# Patient Record
Sex: FEMALE | Race: WHITE | NOT HISPANIC OR LATINO | Employment: PART TIME | URBAN - NONMETROPOLITAN AREA
[De-identification: names, ages, dates, MRNs, and addresses within clinical notes are randomized per-mention and may not be internally consistent; named-entity substitution may affect disease eponyms.]

---

## 2019-11-16 ENCOUNTER — APPOINTMENT (OUTPATIENT)
Dept: GENERAL RADIOLOGY | Facility: OTHER | Age: 39
End: 2019-11-16
Attending: PHYSICIAN ASSISTANT
Payer: COMMERCIAL

## 2019-11-16 ENCOUNTER — HOSPITAL ENCOUNTER (EMERGENCY)
Facility: OTHER | Age: 39
Discharge: HOME | End: 2019-11-16
Attending: PHYSICIAN ASSISTANT | Admitting: SURGERY
Payer: COMMERCIAL

## 2019-11-16 ENCOUNTER — ANESTHESIA (OUTPATIENT)
Dept: SURGERY | Facility: OTHER | Age: 39
End: 2019-11-16
Payer: COMMERCIAL

## 2019-11-16 ENCOUNTER — ANESTHESIA EVENT (OUTPATIENT)
Dept: SURGERY | Facility: OTHER | Age: 39
End: 2019-11-16

## 2019-11-16 ENCOUNTER — ANESTHESIA (OUTPATIENT)
Dept: SURGERY | Facility: OTHER | Age: 39
End: 2019-11-16

## 2019-11-16 ENCOUNTER — ANESTHESIA EVENT (OUTPATIENT)
Dept: SURGERY | Facility: OTHER | Age: 39
End: 2019-11-16
Payer: COMMERCIAL

## 2019-11-16 VITALS
SYSTOLIC BLOOD PRESSURE: 109 MMHG | HEART RATE: 60 BPM | OXYGEN SATURATION: 100 % | RESPIRATION RATE: 20 BRPM | TEMPERATURE: 98.8 F | DIASTOLIC BLOOD PRESSURE: 48 MMHG | WEIGHT: 130 LBS

## 2019-11-16 DIAGNOSIS — S97.122A: ICD-10-CM

## 2019-11-16 DIAGNOSIS — S97.102A CRUSH INJURY OF TOE, LEFT, INITIAL ENCOUNTER: Primary | ICD-10-CM

## 2019-11-16 DIAGNOSIS — S92.912B: ICD-10-CM

## 2019-11-16 PROBLEM — N92.6 IRREGULAR PERIODS: Status: ACTIVE | Noted: 2019-10-24

## 2019-11-16 PROBLEM — R87.613 HIGH GRADE SQUAMOUS INTRAEPITHELIAL LESION (HGSIL) ON CYTOLOGIC SMEAR OF CERVIX: Status: ACTIVE | Noted: 2019-10-24

## 2019-11-16 PROBLEM — B97.7 HPV IN FEMALE: Status: ACTIVE | Noted: 2017-09-26

## 2019-11-16 PROBLEM — F41.9 ANXIETY: Status: ACTIVE | Noted: 2019-10-24

## 2019-11-16 LAB — HCG UR QL: NEGATIVE

## 2019-11-16 PROCEDURE — 96374 THER/PROPH/DIAG INJ IV PUSH: CPT | Mod: XU | Performed by: PHYSICIAN ASSISTANT

## 2019-11-16 PROCEDURE — 28825 PARTIAL AMPUTATION OF TOE: CPT | Performed by: NURSE ANESTHETIST, CERTIFIED REGISTERED

## 2019-11-16 PROCEDURE — 99284 EMERGENCY DEPT VISIT MOD MDM: CPT | Mod: 25 | Performed by: PHYSICIAN ASSISTANT

## 2019-11-16 PROCEDURE — 73660 X-RAY EXAM OF TOE(S): CPT | Mod: LT

## 2019-11-16 PROCEDURE — 88300 SURGICAL PATH GROSS: CPT

## 2019-11-16 PROCEDURE — 37000008 ZZH ANESTHESIA TECHNICAL FEE, 1ST 30 MIN: Performed by: SURGERY

## 2019-11-16 PROCEDURE — 99203 OFFICE O/P NEW LOW 30 MIN: CPT | Mod: 25 | Performed by: SURGERY

## 2019-11-16 PROCEDURE — 25800030 ZZH RX IP 258 OP 636: Performed by: NURSE ANESTHETIST, CERTIFIED REGISTERED

## 2019-11-16 PROCEDURE — 25800030 ZZH RX IP 258 OP 636: Performed by: PHYSICIAN ASSISTANT

## 2019-11-16 PROCEDURE — 25000128 H RX IP 250 OP 636: Performed by: NURSE ANESTHETIST, CERTIFIED REGISTERED

## 2019-11-16 PROCEDURE — 71000012 ZZH RECOVERY PHASE 1 LEVEL 1 FIRST HR: Performed by: SURGERY

## 2019-11-16 PROCEDURE — 27210794 ZZH OR GENERAL SUPPLY STERILE: Performed by: SURGERY

## 2019-11-16 PROCEDURE — 90471 IMMUNIZATION ADMIN: CPT | Performed by: PHYSICIAN ASSISTANT

## 2019-11-16 PROCEDURE — 99284 EMERGENCY DEPT VISIT MOD MDM: CPT | Mod: Z6 | Performed by: PHYSICIAN ASSISTANT

## 2019-11-16 PROCEDURE — 25000125 ZZHC RX 250: Performed by: PHYSICIAN ASSISTANT

## 2019-11-16 PROCEDURE — 25000128 H RX IP 250 OP 636: Performed by: SURGERY

## 2019-11-16 PROCEDURE — 99140 ANES COMP EMERGENCY COND: CPT | Performed by: NURSE ANESTHETIST, CERTIFIED REGISTERED

## 2019-11-16 PROCEDURE — 90715 TDAP VACCINE 7 YRS/> IM: CPT | Performed by: PHYSICIAN ASSISTANT

## 2019-11-16 PROCEDURE — 37000009 ZZH ANESTHESIA TECHNICAL FEE, EACH ADDTL 15 MIN: Performed by: SURGERY

## 2019-11-16 PROCEDURE — 28825 PARTIAL AMPUTATION OF TOE: CPT | Performed by: SURGERY

## 2019-11-16 PROCEDURE — 36000052 ZZH SURGERY LEVEL 2 EA 15 ADDTL MIN: Performed by: SURGERY

## 2019-11-16 PROCEDURE — 81025 URINE PREGNANCY TEST: CPT | Performed by: NURSE ANESTHETIST, CERTIFIED REGISTERED

## 2019-11-16 PROCEDURE — 25000128 H RX IP 250 OP 636: Performed by: PHYSICIAN ASSISTANT

## 2019-11-16 PROCEDURE — 25800025 ZZH RX 258: Performed by: SURGERY

## 2019-11-16 PROCEDURE — 36000050 ZZH SURGERY LEVEL 2 1ST 30 MIN: Performed by: SURGERY

## 2019-11-16 RX ORDER — BUPIVACAINE HYDROCHLORIDE 5 MG/ML
INJECTION, SOLUTION PERINEURAL PRN
Status: DISCONTINUED | OUTPATIENT
Start: 2019-11-16 | End: 2019-11-16 | Stop reason: HOSPADM

## 2019-11-16 RX ORDER — OXYCODONE HYDROCHLORIDE 5 MG/1
5 TABLET ORAL
Status: DISCONTINUED | OUTPATIENT
Start: 2019-11-16 | End: 2019-11-16 | Stop reason: HOSPADM

## 2019-11-16 RX ORDER — LIDOCAINE HYDROCHLORIDE 10 MG/ML
10 INJECTION, SOLUTION INFILTRATION; PERINEURAL ONCE
Status: COMPLETED | OUTPATIENT
Start: 2019-11-16 | End: 2019-11-16

## 2019-11-16 RX ORDER — IBUPROFEN 600 MG/1
600 TABLET, FILM COATED ORAL 4 TIMES DAILY
Qty: 12 TABLET | Refills: 0 | Status: SHIPPED | OUTPATIENT
Start: 2019-11-16 | End: 2019-11-21

## 2019-11-16 RX ORDER — CEFAZOLIN SODIUM 1 G/3ML
1 INJECTION, POWDER, FOR SOLUTION INTRAMUSCULAR; INTRAVENOUS SEE ADMIN INSTRUCTIONS
Status: CANCELLED | OUTPATIENT
Start: 2019-11-16

## 2019-11-16 RX ORDER — KETOROLAC TROMETHAMINE 30 MG/ML
INJECTION, SOLUTION INTRAMUSCULAR; INTRAVENOUS PRN
Status: DISCONTINUED | OUTPATIENT
Start: 2019-11-16 | End: 2019-11-16

## 2019-11-16 RX ORDER — CEFAZOLIN SODIUM 2 G/100ML
INJECTION, SOLUTION INTRAVENOUS PRN
Status: DISCONTINUED | OUTPATIENT
Start: 2019-11-16 | End: 2019-11-16

## 2019-11-16 RX ORDER — ONDANSETRON 4 MG/1
4 TABLET, ORALLY DISINTEGRATING ORAL EVERY 30 MIN PRN
Status: DISCONTINUED | OUTPATIENT
Start: 2019-11-16 | End: 2019-11-16 | Stop reason: HOSPADM

## 2019-11-16 RX ORDER — ONDANSETRON 2 MG/ML
4 INJECTION INTRAMUSCULAR; INTRAVENOUS EVERY 30 MIN PRN
Status: DISCONTINUED | OUTPATIENT
Start: 2019-11-16 | End: 2019-11-16 | Stop reason: HOSPADM

## 2019-11-16 RX ORDER — FENTANYL CITRATE 50 UG/ML
25-50 INJECTION, SOLUTION INTRAMUSCULAR; INTRAVENOUS
Status: CANCELLED | OUTPATIENT
Start: 2019-11-16

## 2019-11-16 RX ORDER — MEPERIDINE HYDROCHLORIDE 50 MG/ML
12.5 INJECTION INTRAMUSCULAR; INTRAVENOUS; SUBCUTANEOUS
Status: DISCONTINUED | OUTPATIENT
Start: 2019-11-16 | End: 2019-11-16 | Stop reason: HOSPADM

## 2019-11-16 RX ORDER — SODIUM CHLORIDE, SODIUM LACTATE, POTASSIUM CHLORIDE, CALCIUM CHLORIDE 600; 310; 30; 20 MG/100ML; MG/100ML; MG/100ML; MG/100ML
INJECTION, SOLUTION INTRAVENOUS CONTINUOUS PRN
Status: DISCONTINUED | OUTPATIENT
Start: 2019-11-16 | End: 2019-11-16

## 2019-11-16 RX ORDER — CEFAZOLIN SODIUM 2 G/100ML
2 INJECTION, SOLUTION INTRAVENOUS
Status: CANCELLED | OUTPATIENT
Start: 2019-11-16

## 2019-11-16 RX ORDER — NALOXONE HYDROCHLORIDE 0.4 MG/ML
.1-.4 INJECTION, SOLUTION INTRAMUSCULAR; INTRAVENOUS; SUBCUTANEOUS
Status: DISCONTINUED | OUTPATIENT
Start: 2019-11-16 | End: 2019-11-16 | Stop reason: HOSPADM

## 2019-11-16 RX ORDER — FENTANYL CITRATE 50 UG/ML
50 INJECTION, SOLUTION INTRAMUSCULAR; INTRAVENOUS EVERY 30 MIN PRN
Status: DISCONTINUED | OUTPATIENT
Start: 2019-11-16 | End: 2019-11-16 | Stop reason: HOSPADM

## 2019-11-16 RX ORDER — ACETAMINOPHEN 10 MG/ML
INJECTION, SOLUTION INTRAVENOUS PRN
Status: DISCONTINUED | OUTPATIENT
Start: 2019-11-16 | End: 2019-11-16

## 2019-11-16 RX ORDER — FENTANYL CITRATE 50 UG/ML
25-50 INJECTION, SOLUTION INTRAMUSCULAR; INTRAVENOUS
Status: DISCONTINUED | OUTPATIENT
Start: 2019-11-16 | End: 2019-11-16 | Stop reason: HOSPADM

## 2019-11-16 RX ORDER — SODIUM CHLORIDE, SODIUM LACTATE, POTASSIUM CHLORIDE, CALCIUM CHLORIDE 600; 310; 30; 20 MG/100ML; MG/100ML; MG/100ML; MG/100ML
INJECTION, SOLUTION INTRAVENOUS CONTINUOUS
Status: CANCELLED | OUTPATIENT
Start: 2019-11-16

## 2019-11-16 RX ORDER — SODIUM CHLORIDE 9 MG/ML
INJECTION, SOLUTION INTRAVENOUS CONTINUOUS
Status: DISCONTINUED | OUTPATIENT
Start: 2019-11-16 | End: 2019-11-16 | Stop reason: HOSPADM

## 2019-11-16 RX ORDER — ACETAMINOPHEN 325 MG/1
650 TABLET ORAL 4 TIMES DAILY
Qty: 24 TABLET | Refills: 0 | Status: SHIPPED | OUTPATIENT
Start: 2019-11-16 | End: 2019-11-21

## 2019-11-16 RX ORDER — SODIUM CHLORIDE, SODIUM LACTATE, POTASSIUM CHLORIDE, CALCIUM CHLORIDE 600; 310; 30; 20 MG/100ML; MG/100ML; MG/100ML; MG/100ML
INJECTION, SOLUTION INTRAVENOUS CONTINUOUS
Status: DISCONTINUED | OUTPATIENT
Start: 2019-11-16 | End: 2019-11-16 | Stop reason: HOSPADM

## 2019-11-16 RX ORDER — KETOROLAC TROMETHAMINE 15 MG/ML
15 INJECTION, SOLUTION INTRAMUSCULAR; INTRAVENOUS ONCE
Status: COMPLETED | OUTPATIENT
Start: 2019-11-16 | End: 2019-11-16

## 2019-11-16 RX ORDER — LIDOCAINE 40 MG/G
CREAM TOPICAL
Status: CANCELLED | OUTPATIENT
Start: 2019-11-16

## 2019-11-16 RX ORDER — ACETAMINOPHEN 650 MG
TABLET, EXTENDED RELEASE ORAL ONCE
Status: COMPLETED | OUTPATIENT
Start: 2019-11-16 | End: 2019-11-16

## 2019-11-16 RX ORDER — HYDROCODONE BITARTRATE AND ACETAMINOPHEN 5; 325 MG/1; MG/1
1 TABLET ORAL EVERY 6 HOURS PRN
Qty: 12 TABLET | Refills: 0 | Status: SHIPPED | OUTPATIENT
Start: 2019-11-16 | End: 2019-11-21

## 2019-11-16 RX ADMIN — SODIUM CHLORIDE, POTASSIUM CHLORIDE, SODIUM LACTATE AND CALCIUM CHLORIDE: 600; 310; 30; 20 INJECTION, SOLUTION INTRAVENOUS at 14:35

## 2019-11-16 RX ADMIN — KETOROLAC TROMETHAMINE 15 MG: 15 INJECTION, SOLUTION INTRAMUSCULAR; INTRAVENOUS at 13:10

## 2019-11-16 RX ADMIN — POVIDONE-IODINE: 10 SOLUTION TOPICAL at 12:53

## 2019-11-16 RX ADMIN — CEFAZOLIN SODIUM 2 G: 2 INJECTION, SOLUTION INTRAVENOUS at 14:39

## 2019-11-16 RX ADMIN — SODIUM CHLORIDE: 9 INJECTION, SOLUTION INTRAVENOUS at 12:52

## 2019-11-16 RX ADMIN — KETOROLAC TROMETHAMINE 15 MG: 30 INJECTION, SOLUTION INTRAMUSCULAR at 14:48

## 2019-11-16 RX ADMIN — TETANUS TOXOID, REDUCED DIPHTHERIA TOXOID AND ACELLULAR PERTUSSIS VACCINE, ADSORBED 0.5 ML: 5; 2.5; 8; 8; 2.5 SUSPENSION INTRAMUSCULAR at 12:54

## 2019-11-16 RX ADMIN — MIDAZOLAM 2 MG: 1 INJECTION INTRAMUSCULAR; INTRAVENOUS at 14:37

## 2019-11-16 RX ADMIN — ACETAMINOPHEN 1000 MG: 10 INJECTION, SOLUTION INTRAVENOUS at 14:48

## 2019-11-16 RX ADMIN — LIDOCAINE HYDROCHLORIDE 10 ML: 10 INJECTION, SOLUTION EPIDURAL; INFILTRATION; INTRACAUDAL; PERINEURAL at 12:01

## 2019-11-16 RX ADMIN — MIDAZOLAM 2 MG: 1 INJECTION INTRAMUSCULAR; INTRAVENOUS at 14:40

## 2019-11-16 ASSESSMENT — ENCOUNTER SYMPTOMS
BACK PAIN: 0
CONFUSION: 0
HEMATURIA: 0
CHILLS: 0
SHORTNESS OF BREATH: 0
CHEST TIGHTNESS: 0
FEVER: 0
ADENOPATHY: 0
ABDOMINAL PAIN: 0
BRUISES/BLEEDS EASILY: 0
WOUND: 0

## 2019-11-16 NOTE — PROGRESS NOTES
Admission Note    Data:  Lakisha Mccoy admitted to Med/Surg/Peds room 332 from surgery at 1550.      Action:  Dr. Avalos has been notified of admission. Pt oriented to unit, call light in reach.     Response:  Patient Tolerated .

## 2019-11-16 NOTE — BRIEF OP NOTE
[facility]    Brief Operative Note    Pre-operative diagnosis: Injury of toe on left foot, initial encounter [S99.922A]  Post-operative diagnosis Crush injury left third toe with open fracture distal phalanx    Procedure: Procedure(s):  PARTIAL AMPUTATION, TOE 3RD DIGIT  Surgeon: Surgeon(s) and Role:     * Kj Avalos MD - Primary  Anesthesia: Monitor Anesthesia Care   Estimated blood loss: Minimal  Drains: None  Specimens:   ID Type Source Tests Collected by Time Destination   A : partial amputation left 3rd toe  Tissue Toe SURGICAL PATHOLOGY EXAM Kj Avalos MD 11/16/2019  2:59 PM      Findings:   None.  Complications: None.  Implants: * No implants in log *

## 2019-11-16 NOTE — ED PROVIDER NOTES
History     Chief Complaint   Patient presents with     Toe Pain     This is a 39-year-old female who was working with her  unloading wood he had Coster a piece of wood and she accidentally dropped it landed on her left foot directly onto her middle toe.  She sustained a crush injury to this toe.  She is here for further evaluation at this time her last tetanus was 2014.  She denies any other injuries.            Allergies:  Allergies no known allergies    Problem List:    There are no active problems to display for this patient.       Past Medical History:    Past Medical History:   Diagnosis Date     History of other genital system and obstetric disorders        Past Surgical History:    No past surgical history on file.    Family History:    Family History   Problem Relation Age of Onset     Heart Disease Maternal Grandmother         Heart Disease,MI     Heart Disease Maternal Grandfather         Heart Disease,MI     Colon Cancer Paternal Grandmother 58        Cancer-colon     Other - See Comments Paternal Uncle 40        Colon polyps     Other - See Comments No family hx of         History Negative for diabetes, anxiety, depression, or chemical depend*       Social History:  Marital Status:   [2]  Social History     Tobacco Use     Smoking status: Not on file   Substance Use Topics     Alcohol use: Not on file     Drug use: Not on file        Medications:    No current outpatient medications on file.        Review of Systems   Constitutional: Negative for chills and fever.   HENT: Negative for congestion.    Eyes: Negative for visual disturbance.   Respiratory: Negative for chest tightness and shortness of breath.    Cardiovascular: Negative for chest pain.   Gastrointestinal: Negative for abdominal pain.   Genitourinary: Negative for hematuria.   Musculoskeletal: Negative for back pain.        Crush injury to left middle toe with pain   Skin: Negative for rash and wound.   Neurological: Negative  for syncope.   Hematological: Negative for adenopathy. Does not bruise/bleed easily.   Psychiatric/Behavioral: Negative for confusion.       Physical Exam   BP: (!) 138/91  Pulse: 83  Temp: 97.9  F (36.6  C)  Resp: 20  SpO2: 98 %      Physical Exam  Constitutional:       General: She is not in acute distress.     Appearance: She is well-developed. She is not diaphoretic.   HENT:      Head: Normocephalic and atraumatic.   Eyes:      General: No scleral icterus.     Conjunctiva/sclera: Conjunctivae normal.   Neck:      Musculoskeletal: Neck supple.   Cardiovascular:      Rate and Rhythm: Normal rate and regular rhythm.   Pulmonary:      Effort: Pulmonary effort is normal.      Breath sounds: Normal breath sounds.   Abdominal:      Palpations: Abdomen is soft.      Tenderness: There is no abdominal tenderness.   Musculoskeletal:         General: Tenderness, deformity and signs of injury present.      Comments: Left middle toe with crush injury and blowout of the skin area.  Nail damage as well.  Minimal bleeding at this time.   Lymphadenopathy:      Cervical: No cervical adenopathy.   Skin:     General: Skin is warm and dry.      Capillary Refill: Capillary refill takes less than 2 seconds.      Findings: No rash.   Neurological:      General: No focal deficit present.      Mental Status: She is alert.   Psychiatric:         Mood and Affect: Mood normal.         Behavior: Behavior normal.         ED Course     After patient returned from x-ray and she was given a digital block to her left middle toe with 8 cc of 1% lidocaine.  After thorough evaluation of the distal tip of her toe much of the skin is crush to the point where repair would be difficult.  I discussed this case with Dr. Avalos with my concerns that it may be better off to amputate the distal tip of her left middle toe and he will come down and evaluate it at this time.      Results for orders placed or performed during the hospital encounter of 11/16/19  (from the past 24 hour(s))   XR Toe Left G/E 2 Views    Narrative    PROCEDURE:  XR TOE LT G/E 2 VW    HISTORY: crush injury    COMPARISON:  None.    TECHNIQUE:  3 views of the right third toe were obtained.    FINDINGS:  There is a severely comminuted fracture of the terminal  tuft of the right third toe. The remainder of the toes intact. The  articular spaces are normal in height.       Impression    IMPRESSION: Severely comminuted terminal tuft fracture third toe      TORO LUGO MD       Medications   povidone-iodine (BETADINE) 10 % topical solution (has no administration in time range)   Tdap (tetanus-diptheria-acell pertussis) (BOOSTRIX) injection 0.5 mL (has no administration in time range)       Assessments & Plan (with Medical Decision Making)     I have reviewed the nursing notes.    I have reviewed the findings, diagnosis, plan and need for follow up with the patient.      New Prescriptions    No medications on file       Final diagnoses:   Crushing injury of third toe of left foot, initial encounter   Open fracture of left toe     Afebrile.  Vital signs stable.  Crush injury to her left third or middle toe.  Blowout laceration noted.  X-rays show severely comminuted terminal tuft fracture to the third toe.  After giving the patient a digital block and further examination of her crush injury was felt that she may benefit from partial amputation and surgical repair.  I discussed this case with  who plans on bringing her back to the OR for surgical intervention.  Otherwise she is medically cleared for the surgery.    11/16/2019   [facility]     Robert Abreu PA-C  11/16/19 9011

## 2019-11-16 NOTE — ANESTHESIA CARE TRANSFER NOTE
Patient: Lakisha KOROMA Block    Procedure(s):  PARTIAL AMPUTATION, TOE 3RD DIGIT    Diagnosis: Injury of toe on left foot, initial encounter [S99.922A]  Diagnosis Additional Information: No value filed.    Anesthesia Type:   No value filed.     Note:  Airway :Room Air  Patient transferred to:Phase II  Handoff Report: Identifed the Patient, Identified the Reponsible Provider, Reviewed the pertinent medical history, Discussed the surgical course, Reviewed Intra-OP anesthesia mangement and issues during anesthesia, Set expectations for post-procedure period and Allowed opportunity for questions and acknowledgement of understanding      Vitals: (Last set prior to Anesthesia Care Transfer)    CRNA VITALS  11/16/2019 1441 - 11/16/2019 1521      11/16/2019             NIBP:  112/63    Pulse:  61    NIBP Mean:  81    Ht Rate:  61    SpO2:  100 %    Resp Rate (observed):  (!) 1    Resp Rate (set):  10                Electronically Signed By: LILIBETH Baird CRNA  November 16, 2019  3:21 PM

## 2019-11-16 NOTE — ANESTHESIA PREPROCEDURE EVALUATION
Anesthesia Pre-Procedure Evaluation    Patient: Lakisha Mccoy   MRN: [id number] : 1980          Preoperative Diagnosis: Injury of toe on left foot, initial encounter [S99.922A]    Procedure(s):  PARTIAL AMPUTATION, TOE 3RD DIGIT    Past Medical History:   Diagnosis Date     History of other genital system and obstetric disorders      A1, miscarriage-Normal vaginal delivery at term     Past Surgical History:   Procedure Laterality Date     HX AMPUTATION Left 2019    Partial amputation left third ( middle ) toe       Anesthesia Evaluation     . Pt has not had prior anesthetic            ROS/MED HX    ENT/Pulmonary:  - neg pulmonary ROS     Neurologic:  - neg neurologic ROS     Cardiovascular:  - neg cardiovascular ROS       METS/Exercise Tolerance:     Hematologic:  - neg hematologic  ROS       Musculoskeletal: Comment: Left third toe fx  (+) fracture lower extremity: Other (Comment), -       GI/Hepatic:  - neg GI/hepatic ROS       Renal/Genitourinary:  - ROS Renal section negative       Endo:  - neg endo ROS       Psychiatric:     (+) psychiatric history anxiety      Infectious Disease:  - neg infectious disease ROS       Malignancy:      - no malignancy   Other:    - neg other ROS                      Physical Exam  Normal systems: cardiovascular, pulmonary and dental    Airway   Mallampati: II  TM distance: >3 FB  Neck ROM: full    Dental     Cardiovascular   Rhythm and rate: regular and normal      Pulmonary    breath sounds clear to auscultation            Lab Results   Component Value Date    HCG Negative 2019       Preop Vitals  BP Readings from Last 3 Encounters:   19 115/62    Pulse Readings from Last 3 Encounters:   19 63      Resp Readings from Last 3 Encounters:   19 22    SpO2 Readings from Last 3 Encounters:   19 97%      Temp Readings from Last 1 Encounters:   19 98.1  F (36.7  C)    Ht Readings from Last 1 Encounters:   No data found for Ht      Wt  Readings from Last 1 Encounters:   11/16/19 59 kg (130 lb)    There is no height or weight on file to calculate BMI.       Anesthesia Plan      History & Physical Review      ASA Status:  1 emergent.        Plan for MAC (anxiolysis, local per surgeon )          Postoperative Care      Consents  Anesthetic plan, risks, benefits and alternatives discussed with:  Patient..                 LILIBETH Baird CRNA

## 2019-11-16 NOTE — ANESTHESIA PREPROCEDURE EVALUATION
Anesthesia Pre-Procedure Evaluation    Patient: Lakisha Mccoy   MRN: [id number] : 1980          Preoperative Diagnosis: Crushing injury of third toe of left foot, initial encounter [S97.122A]    Procedure(s):  AMPUTATION, TOE Left third    Past Medical History:   Diagnosis Date     History of other genital system and obstetric disorders      A1, miscarriage-Normal vaginal delivery at term     History reviewed. No pertinent surgical history.    Anesthesia Evaluation     .             ROS/MED HX    ENT/Pulmonary:     (+)allergic rhinitis, , . .    Neurologic:  - neg neurologic ROS     Cardiovascular:  - neg cardiovascular ROS       METS/Exercise Tolerance:     Hematologic:  - neg hematologic  ROS       Musculoskeletal: Comment: Third toe  (+) fracture lower extremity: Other (Comment), -       GI/Hepatic:  - neg GI/hepatic ROS       Renal/Genitourinary:  - ROS Renal section negative       Endo:  - neg endo ROS       Psychiatric:     (+) psychiatric history anxiety      Infectious Disease:  - neg infectious disease ROS       Malignancy:      - no malignancy   Other:    - neg other ROS                      Physical Exam  Normal systems: cardiovascular, pulmonary and dental    Airway   Mallampati: II  TM distance: >3 FB  Neck ROM: full    Dental     Cardiovascular   Rhythm and rate: regular and normal      Pulmonary    breath sounds clear to auscultation            No results found for: WBC, HGB, HCT, PLT, CRP, SED, NA, POTASSIUM, CHLORIDE, CO2, BUN, CR, GLC, VERONICA, PHOS, MAG, ALBUMIN, PROTTOTAL, ALT, AST, GGT, ALKPHOS, BILITOTAL, BILIDIRECT, LIPASE, AMYLASE, JOHNSON, PTT, INR, FIBR, TSH, T4, T3, HCG, HCGS, CKTOTAL, CKMB, TROPN    Preop Vitals  BP Readings from Last 3 Encounters:   19 (!) 138/91    Pulse Readings from Last 3 Encounters:   19 83      Resp Readings from Last 3 Encounters:   19 20    SpO2 Readings from Last 3 Encounters:   19 98%      Temp Readings from Last 1 Encounters:    11/16/19 97.9  F (36.6  C) (Tympanic)    Ht Readings from Last 1 Encounters:   No data found for Ht      Wt Readings from Last 1 Encounters:   11/16/19 59 kg (130 lb)    There is no height or weight on file to calculate BMI.       Anesthesia Plan      History & Physical Review      ASA Status:  1 emergent.        Plan for MAC (Anxiolysis, local per surgeon )          Postoperative Care      Consents  Anesthetic plan, risks, benefits and alternatives discussed with:  Patient..                 LILIBETH Baird CRNA

## 2019-11-16 NOTE — OR NURSING
PACU Transfer Note    Lakisha Mccoy was transferred to [facility] via .  Equipment used for transport:  none.  Accompanied by:  RN  Prescriptions were: sent with pt.    PACU Respiratory Event Documentation     1) Episodes of Apnea greater than or equal to 10 seconds: no    2) Bradypnea - less than 8 breaths per minute: no    3) Pain score on 0 to 10 scale: none    4) Pain-sedation mismatch (yes or no): no    5) Repeated 02 desaturation less than 90% (yes or no): no    Anesthesia notified? (yes or no): no  Report to Thuy TORRES  Any of the above events occuring repeatedly in separate 30 minute intervals may be considered recurrent PACU respiratory events.    Patient stable and meets phase 1 discharge criteria for transport from PACU.

## 2019-11-16 NOTE — ANESTHESIA POSTPROCEDURE EVALUATION
Patient: Lakisha KOROMA Block    Procedure(s):  PARTIAL AMPUTATION, TOE 3RD DIGIT    Diagnosis:Injury of toe on left foot, initial encounter [S99.922A]  Diagnosis Additional Information: No value filed.    Anesthesia Type:  No value filed.    Note:  Anesthesia Post Evaluation    Patient location during evaluation: PACU  Patient participation: Able to fully participate in evaluation  Level of consciousness: awake and alert  Pain management: adequate  Airway patency: patent  Cardiovascular status: acceptable  Respiratory status: acceptable  Hydration status: acceptable  PONV: none     Anesthetic complications: None          Last vitals:  Vitals:    11/16/19 1525 11/16/19 1530 11/16/19 1535   BP: 107/61 111/64 115/62   Pulse: 61 58 63   Resp: 12 12 22   Temp:  98.1  F (36.7  C)    SpO2: 97%           Electronically Signed By: LILIBETH Baird CRNA  November 16, 2019  3:45 PM

## 2019-11-16 NOTE — H&P
Surgical ER Consult  Referring physician:  JESSE Abreu  Primary physician:     Pratibha Patrick    Chief complaint:   Crush injury left third toe    History of present illness:  This is a 39 year old female I am seeing in consultation for a crush injury to left third toe. Dropped a piece of split wood onto left third toe crushing it from mid toe distally. X-ray shows distal phalanx to be comminuted. Up-to-date on tetanus.    Past medical history:   Past Medical History:   Diagnosis Date     History of other genital system and obstetric disorders      A1, miscarriage-Normal vaginal delivery at term       Pastsurgical history:  History reviewed. No pertinent surgical history.    Current medications:  No current outpatient medications on file.       Allergies:  No Known Allergies    Family history:  Family History   Problem Relation Age of Onset     Heart Disease Maternal Grandfather         Heart Disease,MI     Colon Cancer Paternal Grandmother 58        Cancer-colon     Heart Disease Maternal Grandmother         Heart Disease,MI     Other - See Comments Paternal Uncle 40        Colon polyps       Social history:  Social History     Socioeconomic History     Marital status:      Spouse name: Not on file     Number of children: Not on file     Years of education: Not on file     Highest education level: Not on file   Occupational History     Not on file   Social Needs     Financial resource strain: Not on file     Food insecurity:     Worry: Not on file     Inability: Not on file     Transportation needs:     Medical: Not on file     Non-medical: Not on file   Tobacco Use     Smoking status: Not on file   Substance and Sexual Activity     Alcohol use: Not on file     Drug use: Not on file     Sexual activity: Not on file   Lifestyle     Physical activity:     Days per week: Not on file     Minutes per session: Not on file     Stress: Not on file   Relationships     Social connections:     Talks on phone:  Not on file     Gets together: Not on file     Attends Christianity service: Not on file     Active member of club or organization: Not on file     Attends meetings of clubs or organizations: Not on file     Relationship status: Not on file     Intimate partner violence:     Fear of current or ex partner: Not on file     Emotionally abused: Not on file     Physically abused: Not on file     Forced sexual activity: Not on file   Other Topics Concern     Not on file   Social History Narrative    .  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     Has one son-Juan Daniel    Preload  03/11/2013       PROBLEM LIST:  Patient Active Problem List   Diagnosis     Irregular periods     HPV in female     High grade squamous intraepithelial lesion (HGSIL) on cytologic smear of cervix     Anxiety     Allergic rhinitis due to pollen     Crush injury of toe, left, initial encounter     Review of systems:  COMPLETE REVIEW OF SYSTEMS: Denies problems  Gastrointestinal: Denies problems  Cardiovascular: Denies problems  Respiratory:Denies problems  Genitourinary: Denies problems   Musculoskeletal: See HPI  Skin:Denies problems  Neurologic: Denies problems  Psychiatric: Denies problems  Endocrine: Denies problems  Heme/Lymphatic: Denies problems  Vascular: Denies problems    Physical exam: BP (!) 138/91   Pulse 83   Temp 97.9  F (36.6  C) (Tympanic)   Resp 20   Wt 59 kg (130 lb)   SpO2 98%       General: this is a pleasant female patient in no acute distress.  Patient is awake alert and oriented x3 .   Respiratory:  Clear   Cardiovascular: Regular rate and rhythym  Left foot: DP pulse present. Crushed left third toe. Soft tissue is free from bone and extends to mid toe. Remaining toes unremarkable.    Assessment:   Crush injury left third toe with open fracture of distal phalanx    Plan:    Partial left third toe amputation under local MAC as a Day surgery. Risks of bleeding, infection, scarring discussed and  wishes to proceed.      Kj Avalos MD FACS

## 2019-11-16 NOTE — OP NOTE
Procedure Date: 2019      PREOPERATIVE DIAGNOSIS:  Crush injury, left third toe with open fracture of the distal phalanx.      POSTOPERATIVE DIAGNOSIS:  Crush injury, left third toe with open fracture of the distal phalanx.      PROCEDURE:  Partial left third toe amputation.      SURGEON:  Dev Mustafa MD.      ASSISTANT:  NAT Guido.      ANESTHESIA:  Local MAC, LILIBETH Matias CRNA.      INDICATION FOR PROCEDURE:  The patient is a 39-year-old woman who dropped a split piece of wood on her left third toe crushing the distal phalanx.      DESCRIPTION OF PROCEDURE:  After adequate sedation, a local anesthetic consisting of 0.25% Marcaine was infiltrated into the area around the left third toe as a digital block.  The patient was then prepped and draped in the usual sterile fashion.  There was an anterior and a posterior flap created by the crush injury.  The edges of this were smoothed.  There were some fragments of bone that were removed.  Dissection was carried onto the middle phalanx which was then divided and smoothed with a rasp.  The wound was irrigated with normal saline.  There was good hemostasis.  The subcutaneous tissue was closed over the bone with a 2-0 Vicryl suture.  The skin was trimmed to healthy-appearing skin, both on the plantar and dorsal surface.  Interrupted 2-0 nylon sutures were then placed with good approximation.  The dorsal skin was slightly abraded from the injury.  A bulky foot dressing was applied and the patient taken to recovery room in stable condition.         DEV MUSTAFA MD             D: 2019   T: 2019   MT:       Name:     DAISY THOMAS   MRN:      [id number]        Account:        [id number]   :      1980           Procedure Date: 2019      Document: [id number]

## 2019-11-17 NOTE — PLAN OF CARE
Pt denies pain, up walking, and has voided. VS WDL.  Pt denies numbness or tingling to LLE, CMS intact. Unable to assess pedal pulse due to dressing. Dressing had a small amount of shadowing. /48   Pulse 60   Temp 98.8  F (37.1  C) (Tympanic)   Resp 20   Wt 59 kg (130 lb)   SpO2 100%

## 2019-11-21 ENCOUNTER — OFFICE VISIT (OUTPATIENT)
Dept: SURGERY | Facility: OTHER | Age: 39
End: 2019-11-21
Attending: SURGERY
Payer: COMMERCIAL

## 2019-11-21 VITALS
SYSTOLIC BLOOD PRESSURE: 108 MMHG | HEART RATE: 66 BPM | DIASTOLIC BLOOD PRESSURE: 62 MMHG | WEIGHT: 133 LBS | TEMPERATURE: 96.7 F | RESPIRATION RATE: 16 BRPM

## 2019-11-21 DIAGNOSIS — Z98.890 POSTOPERATIVE STATE: Primary | ICD-10-CM

## 2019-11-21 PROBLEM — S97.102A: Status: RESOLVED | Noted: 2019-11-16 | Resolved: 2019-11-21

## 2019-11-21 PROBLEM — S92.912B: Status: RESOLVED | Noted: 2019-11-16 | Resolved: 2019-11-21

## 2019-11-21 PROCEDURE — 99024 POSTOP FOLLOW-UP VISIT: CPT | Performed by: SURGERY

## 2019-11-21 ASSESSMENT — PAIN SCALES - GENERAL: PAINLEVEL: NO PAIN (0)

## 2019-11-21 NOTE — PROGRESS NOTES
Subjective:  This is a follow up after left third toe amputation on 11/16/19. The patient has no complaints.  Foot is sore when walking.     Objective:/62 (BP Location: Right arm, Patient Position: Sitting, Cuff Size: Adult Regular)   Pulse 66   Temp 96.7  F (35.9  C) (Tympanic)   Resp 16   Wt 60.3 kg (133 lb)   Breastfeeding No   The incision is healing well without erythema. Abraded skin is healing. Skin edges appear viable.    Assessment:   Doing well after traumatic amputation of left third toe    Plan:  Leave sutures in for 10-14 days.  Continue neosporin BID  Follow-up in couple of weeks to re-check wound  Recommend reduced work schedule for next couple of weeks.

## 2019-11-25 ENCOUNTER — TELEPHONE (OUTPATIENT)
Dept: SURGERY | Facility: OTHER | Age: 39
End: 2019-11-25

## 2019-11-25 NOTE — LETTER
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

## 2019-12-05 ENCOUNTER — OFFICE VISIT (OUTPATIENT)
Dept: SURGERY | Facility: OTHER | Age: 39
End: 2019-12-05
Attending: SURGERY
Payer: COMMERCIAL

## 2019-12-05 VITALS
DIASTOLIC BLOOD PRESSURE: 62 MMHG | HEART RATE: 72 BPM | SYSTOLIC BLOOD PRESSURE: 112 MMHG | WEIGHT: 133 LBS | TEMPERATURE: 96.1 F | RESPIRATION RATE: 16 BRPM

## 2019-12-05 DIAGNOSIS — Z98.890 POSTOPERATIVE STATE: Primary | ICD-10-CM

## 2019-12-05 PROBLEM — S97.122A: Status: RESOLVED | Noted: 2019-11-16 | Resolved: 2019-12-05

## 2019-12-05 PROCEDURE — 99024 POSTOP FOLLOW-UP VISIT: CPT | Performed by: SURGERY

## 2019-12-05 RX ORDER — NORGESTIMATE AND ETHINYL ESTRADIOL 0.25-0.035
1 KIT ORAL
COMMUNITY
Start: 2019-10-22 | End: 2023-02-09

## 2019-12-05 ASSESSMENT — PAIN SCALES - GENERAL: PAINLEVEL: MILD PAIN (2)

## 2019-12-05 NOTE — LETTER
December 5, 2019      Lakisha Mccoy  [address] [place] [address]        To Whom It May Concern:    Lakisha Mccoy was seen in our clinic today. She may return to work without restrictions.      Sincerely,        Kj Avalos MD

## 2019-12-05 NOTE — PROGRESS NOTES
Subjective:  This is a follow up after left third toe amputation on 11/16/19. The patient has no complaints.      Objective:/62 (BP Location: Right arm, Patient Position: Sitting, Cuff Size: Adult Regular)   Pulse 72   Temp 96.1  F (35.6  C) (Tympanic)   Resp 16   Wt 60.3 kg (133 lb)   LMP 11/25/2019   Breastfeeding No   The incision is healing well without erythema. Minimal scabs remain.    Assessment:   Doing well after partial amputation left third toe    Plan:  May return to work without restrictions and follow-up as needed.

## 2019-12-05 NOTE — NURSING NOTE
Chief Complaint   Patient presents with     Surgical Followup     Post op toe amputation       Initial /62 (BP Location: Right arm, Patient Position: Sitting, Cuff Size: Adult Regular)   Pulse 72   Temp 96.1  F (35.6  C) (Tympanic)   Resp 16   Wt 60.3 kg (133 lb)   LMP 11/25/2019   Breastfeeding No  There is no height or weight on file to calculate BMI.  Medication Reconciliation: Completed     Deb Ledezma LPN

## 2020-08-11 ENCOUNTER — ALLIED HEALTH/NURSE VISIT (OUTPATIENT)
Dept: FAMILY MEDICINE | Facility: OTHER | Age: 40
End: 2020-08-11
Payer: COMMERCIAL

## 2020-08-11 DIAGNOSIS — R50.9 FEVER, UNSPECIFIED: ICD-10-CM

## 2020-08-11 DIAGNOSIS — R05.9 COUGH: Primary | ICD-10-CM

## 2020-08-11 PROCEDURE — 99207 ZZC NO CHARGE LOS: CPT

## 2020-08-11 PROCEDURE — U0003 INFECTIOUS AGENT DETECTION BY NUCLEIC ACID (DNA OR RNA); SEVERE ACUTE RESPIRATORY SYNDROME CORONAVIRUS 2 (SARS-COV-2) (CORONAVIRUS DISEASE [COVID-19]), AMPLIFIED PROBE TECHNIQUE, MAKING USE OF HIGH THROUGHPUT TECHNOLOGIES AS DESCRIBED BY CMS-2020-01-R: HCPCS | Mod: ZL

## 2020-08-11 PROCEDURE — C9803 HOPD COVID-19 SPEC COLLECT: HCPCS

## 2020-08-13 LAB
SARS-COV-2 RNA SPEC QL NAA+PROBE: NOT DETECTED
SPECIMEN SOURCE: NORMAL

## 2020-12-27 ENCOUNTER — HEALTH MAINTENANCE LETTER (OUTPATIENT)
Age: 40
End: 2020-12-27

## 2021-04-11 ENCOUNTER — ALLIED HEALTH/NURSE VISIT (OUTPATIENT)
Dept: FAMILY MEDICINE | Facility: OTHER | Age: 41
End: 2021-04-11
Attending: FAMILY MEDICINE
Payer: COMMERCIAL

## 2021-04-11 DIAGNOSIS — R06.02 SHORTNESS OF BREATH: ICD-10-CM

## 2021-04-11 DIAGNOSIS — Z20.822 EXPOSURE TO 2019 NOVEL CORONAVIRUS: ICD-10-CM

## 2021-04-11 DIAGNOSIS — R05.9 COUGH: Primary | ICD-10-CM

## 2021-04-11 PROCEDURE — C9803 HOPD COVID-19 SPEC COLLECT: HCPCS

## 2021-04-11 PROCEDURE — U0005 INFEC AGEN DETEC AMPLI PROBE: HCPCS | Mod: ZL | Performed by: FAMILY MEDICINE

## 2021-04-11 PROCEDURE — U0003 INFECTIOUS AGENT DETECTION BY NUCLEIC ACID (DNA OR RNA); SEVERE ACUTE RESPIRATORY SYNDROME CORONAVIRUS 2 (SARS-COV-2) (CORONAVIRUS DISEASE [COVID-19]), AMPLIFIED PROBE TECHNIQUE, MAKING USE OF HIGH THROUGHPUT TECHNOLOGIES AS DESCRIBED BY CMS-2020-01-R: HCPCS | Mod: ZL | Performed by: FAMILY MEDICINE

## 2021-04-12 LAB
LABORATORY COMMENT REPORT: ABNORMAL
SARS-COV-2 RNA RESP QL NAA+PROBE: NORMAL
SARS-COV-2 RNA RESP QL NAA+PROBE: POSITIVE
SPECIMEN SOURCE: ABNORMAL
SPECIMEN SOURCE: NORMAL

## 2021-10-09 ENCOUNTER — HEALTH MAINTENANCE LETTER (OUTPATIENT)
Age: 41
End: 2021-10-09

## 2021-11-06 ENCOUNTER — ALLIED HEALTH/NURSE VISIT (OUTPATIENT)
Dept: FAMILY MEDICINE | Facility: OTHER | Age: 41
End: 2021-11-06
Attending: FAMILY MEDICINE
Payer: COMMERCIAL

## 2021-11-06 DIAGNOSIS — Z20.822 COVID-19 RULED OUT: ICD-10-CM

## 2021-11-06 DIAGNOSIS — Z20.822 EXPOSURE TO 2019 NOVEL CORONAVIRUS: Primary | ICD-10-CM

## 2021-11-06 PROCEDURE — C9803 HOPD COVID-19 SPEC COLLECT: HCPCS

## 2021-11-06 PROCEDURE — U0005 INFEC AGEN DETEC AMPLI PROBE: HCPCS | Mod: ZL

## 2021-11-07 LAB — SARS-COV-2 RNA RESP QL NAA+PROBE: POSITIVE

## 2022-01-29 ENCOUNTER — HEALTH MAINTENANCE LETTER (OUTPATIENT)
Age: 42
End: 2022-01-29

## 2022-09-13 ENCOUNTER — TRANSFERRED RECORDS (OUTPATIENT)
Dept: MULTI SPECIALTY CLINIC | Facility: CLINIC | Age: 42
End: 2022-09-13

## 2022-09-13 LAB
HPV ABSTRACT: NORMAL
PAP-ABSTRACT: NORMAL

## 2022-09-17 ENCOUNTER — HEALTH MAINTENANCE LETTER (OUTPATIENT)
Age: 42
End: 2022-09-17

## 2023-02-09 ENCOUNTER — OFFICE VISIT (OUTPATIENT)
Dept: OBGYN | Facility: OTHER | Age: 43
End: 2023-02-09
Attending: OBSTETRICS & GYNECOLOGY
Payer: COMMERCIAL

## 2023-02-09 VITALS — WEIGHT: 135 LBS | SYSTOLIC BLOOD PRESSURE: 118 MMHG | HEART RATE: 83 BPM | DIASTOLIC BLOOD PRESSURE: 72 MMHG

## 2023-02-09 DIAGNOSIS — N92.0 MENORRHAGIA WITH REGULAR CYCLE: Primary | ICD-10-CM

## 2023-02-09 PROCEDURE — 99214 OFFICE O/P EST MOD 30 MIN: CPT | Performed by: OBSTETRICS & GYNECOLOGY

## 2023-02-09 NOTE — PROGRESS NOTES
CC: heavy periods, mood swings  HPI:  Laiksha is a 42 year old female who presents for discussion of management of heavy regular periods. They come monthly, lasting 5-7 days. 3 days are very heavy requiring hourly changes. She tried OCP's without benefit. She contracepts via vasectomy with her spouse. She has history of mod or severe dysplasia treated with LEEP 3 years ago. Last two annual checks were normal.  She has rare urinary stress incontinence. She denies bowel issues. She recently started an herbal supplement for her menopausal symptoms which include mood swings. She is on Zoloft since the birth of her last child. Her deliveries were vaginal and uncomplicated.  Patient's last menstrual period was 2023 (exact date).  Mom went through menopause in mid-50's.    OB History   No obstetric history on file.     Past Medical History:   Diagnosis Date     History of other genital system and obstetric disorders      A1, miscarriage-Normal vaginal delivery at term     Past Surgical History:   Procedure Laterality Date     AMPUTATE TOE(S) Left 2019    Procedure: PARTIAL AMPUTATION, TOE 3RD DIGIT;  Surgeon: Kj Avalos MD;  Location: [facility] OR     HX AMPUTATION Left 2019    Partial amputation left third ( middle ) toe     Social History     Socioeconomic History     Marital status:      Spouse name: Not on file     Number of children: Not on file     Years of education: Not on file     Highest education level: Not on file   Occupational History     Not on file   Tobacco Use     Smoking status: Never     Smokeless tobacco: Never   Substance and Sexual Activity     Alcohol use: Not Currently     Drug use: Never     Sexual activity: Not on file   Other Topics Concern     Not on file   Social History Narrative    .  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     Has one son-Juan Daniel    Preload  2013     Social Determinants of Health     Financial Resource Strain: Not  on file   Food Insecurity: Not on file   Transportation Needs: Not on file   Physical Activity: Not on file   Stress: Not on file   Social Connections: Not on file   Intimate Partner Violence: Not on file   Housing Stability: Not on file     Family History   Problem Relation Age of Onset     Heart Disease Maternal Grandfather         Heart Disease,MI     Colon Cancer Paternal Grandmother 58        Cancer-colon     Heart Disease Maternal Grandmother         Heart Disease,MI     Other - See Comments Paternal Uncle 40        Colon polyps       Current Outpatient Medications   Medication     cholecalciferol (VITAMIN D3) 5000 units (125 mcg) capsule     sertraline (ZOLOFT) 50 MG tablet     No current facility-administered medications for this visit.     No Known Allergies  /72   Pulse 83   Wt 61.2 kg (135 lb)   LMP 01/30/2023 (Exact Date)     REVIEW OF SYSTEMS  Neg except as above    Exam:  Constitutional: healthy, alert, active and no distress    Lab: No results found for any visits on 02/09/23.    ASSESSMENT/PLAN :  1. Menorrhagia with regular cycle      Recommend endometrial biopsy, patient pleasantly declines today.  Discussed management options including hysterectomy and endometrial ablation.  She would like an endometrial ablation.  Will have her come back after her February vacation for biopsy and preop exam with her menses, then plan for hysteroscopy with D&C and Novasure endometrial ablation.  [facility] would like [facility] of [facility] for her perimenopausal symptoms.    Morgan Durbin MD FACOG  8:17 AM 2/9/2023

## 2023-02-09 NOTE — NURSING NOTE
Chief Complaint   Patient presents with     Consult     Heavy Periods     Patient is here to discuss heavy periods x 3 years. Cycles are regular, bleeding 4 days, clots with bleeding, and changing ultra tampon every hour, heavy bleeding x 2 days. Patient was on OCP, but did not notice any difference while being on medication. Patient does have 5 children and is not interested in having any more children.   Initial /72   Pulse 83   Wt 61.2 kg (135 lb)   LMP 01/30/2023 (Exact Date)  There is no height or weight on file to calculate BMI.  Medication Reconciliation: complete          Kathryn Lindsey LPN

## 2023-03-30 ENCOUNTER — OFFICE VISIT (OUTPATIENT)
Dept: OBGYN | Facility: OTHER | Age: 43
End: 2023-03-30
Attending: OBSTETRICS & GYNECOLOGY
Payer: COMMERCIAL

## 2023-03-30 VITALS — WEIGHT: 135 LBS | SYSTOLIC BLOOD PRESSURE: 110 MMHG | HEART RATE: 84 BPM | DIASTOLIC BLOOD PRESSURE: 64 MMHG

## 2023-03-30 DIAGNOSIS — N93.9 ABNORMAL UTERINE BLEEDING (AUB): Primary | ICD-10-CM

## 2023-03-30 PROCEDURE — 88305 TISSUE EXAM BY PATHOLOGIST: CPT

## 2023-03-30 PROCEDURE — 99214 OFFICE O/P EST MOD 30 MIN: CPT | Mod: 25 | Performed by: OBSTETRICS & GYNECOLOGY

## 2023-03-30 PROCEDURE — 58100 BIOPSY OF UTERUS LINING: CPT | Performed by: OBSTETRICS & GYNECOLOGY

## 2023-03-30 RX ORDER — KETOROLAC TROMETHAMINE 30 MG/ML
30 INJECTION, SOLUTION INTRAMUSCULAR; INTRAVENOUS ONCE
Status: CANCELLED | OUTPATIENT
Start: 2023-03-30 | End: 2023-03-30

## 2023-03-30 RX ORDER — ACETAMINOPHEN 325 MG/1
975 TABLET ORAL ONCE
Status: CANCELLED | OUTPATIENT
Start: 2023-03-30 | End: 2023-03-30

## 2023-03-30 ASSESSMENT — PAIN SCALES - GENERAL: PAINLEVEL: NO PAIN (0)

## 2023-03-30 NOTE — PROGRESS NOTES
CC: abnormal uterine bleeding  HPI:  Lakisha is a 42 year old female who presents for evaluation of heavy and regular menstrual cycles  Patient's last menstrual period was 2023 (exact date).  Menstrual history: monthly and heavy changing protection frequently for 3-5 days.    OB History   No obstetric history on file.     Past Medical History:   Diagnosis Date     History of other genital system and obstetric disorders      A1, miscarriage-Normal vaginal delivery at term     Past Surgical History:   Procedure Laterality Date     AMPUTATE TOE(S) Left 2019    Procedure: PARTIAL AMPUTATION, TOE 3RD DIGIT;  Surgeon: Kj Avalos MD;  Location: [facility] OR     HX AMPUTATION Left 2019    Partial amputation left third ( middle ) toe     Social History     Socioeconomic History     Marital status:      Spouse name: Not on file     Number of children: Not on file     Years of education: Not on file     Highest education level: Not on file   Occupational History     Not on file   Tobacco Use     Smoking status: Never     Smokeless tobacco: Never   Substance and Sexual Activity     Alcohol use: Not Currently     Drug use: Never     Sexual activity: Not on file   Other Topics Concern     Not on file   Social History Narrative    .  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    Preload  2013     Social Determinants of Health     Financial Resource Strain: Not on file   Food Insecurity: Not on file   Transportation Needs: Not on file   Physical Activity: Not on file   Stress: Not on file   Social Connections: Not on file   Intimate Partner Violence: Not on file   Housing Stability: Not on file     Family History   Problem Relation Age of Onset     Heart Disease Maternal Grandfather         Heart Disease,MI     Colon Cancer Paternal Grandmother 58        Cancer-colon     Heart Disease Maternal Grandmother         Heart Disease,MI     Other - See  Comments Paternal Uncle 40        Colon polyps     Current Outpatient Medications   Medication     cholecalciferol (VITAMIN D3) 5000 units (125 mcg) capsule     sertraline (ZOLOFT) 50 MG tablet     No current facility-administered medications for this visit.     No Known Allergies  /64   Pulse 84   Wt 61.2 kg (135 lb)   LMP 03/20/2023 (Exact Date)     REVIEW OF SYSTEMS  Neg except as above    Exam:  Constitutional: healthy, alert, active and no distress  CV: RRR no GRM   Resp: CTA B   Abdomen: NT, ND   Pelvic: Normal BUSE, vulva appears normal, vagina and cervix are normal. . Chaperone was present.   After consent was obtained, the cervix was prepped with betadine. The cervix was grasped with a tenaculum and gently dilated with an Os Finder. She sounded to 10 cm and two passes were performed productive of modest amount of tissue with the pipele instrument. The cervix was released and no bleeding noted. Patient tolerated the procedure well.    Lab: No results found for any visits on 03/30/23.    ASSESSMENT/PLAN :  1. Abnormal uterine bleeding (AUB)      Assuming a normal biopsy she would like surgical intervention in the form of endometrial ablation and would like to avoid further hormone use. She is OK for MAC anesthesia and will schedule her accordingly for Hysteroscopy, Myosure endometrial resection and Novasure endometrial ablation.    Morgan Durbin MD FACOG  8:26 AM 3/30/2023

## 2023-03-30 NOTE — PROGRESS NOTES
"Date of Surgery: 05/03/23  Type of Surgery: D&C, Ablation, Hysteroscopy   Surgeon: Dr. Morgan Durbin     Patient was given surgical folder  which includes pre-operative bathing instructions related to the two packets of Hibiclens surgical prep provided. appropriate appointments were scheduled by the Unit [facility] .. Surgical forms were copied and kept for informative purposes. Originals were delivered to Day-surgery. Questions were answered to the best of this nurse's ability.        STOP BANG    Fever/Chills or other infectious symptoms in past month? NO  >10 pound weight loss in the past 2 months? NO  Health Care Directive on file? NO  History of blood transfusions? NO  Td up to date? Yes   History of VRE/MRSA? No      Obstructive Sleep Apnea screening    Preoperative Evaluation: Obstructive Sleep Apnea screening    S: Snore -  Do you snore loudly? (louder than talking or loud enough to be heard through closed doors) No  T: Tired - Do you often feel tired, fatigued, or sleepy during the daytime?No  O: Observed - Has anyone ever observed you stop breathing during your sleep?No  P: Pressure - Do you have or are you being treated for high blood pressure?No  B: BMI - BMI greater than 35kg/m2?No  A: Age - Age over 50 years old?No  N: Neck - Neck circumference greater than 40 cm?No  G: Gender - Gender: Male?No    Total number of \"YES\" responses:  0    Scoring: Low risk of ULICES 0-2  At Risk of ULICES: >3 High Risk of ULICES: 5-8      Total yes answers in ULICES section:    Low risk 0-2  At risk 3-4  High risk 5-8    Marycarmen Mccall RN............. 3/30/2023 8:49 AM     "

## 2023-03-30 NOTE — NURSING NOTE
Chief Complaint   Patient presents with     Consult     EMB   patient is here for EMB as discussed at visit in Feb.     Initial /64   Pulse 84   Wt 61.2 kg (135 lb)   LMP 03/20/2023 (Exact Date)  There is no height or weight on file to calculate BMI.  Medication Reconciliation: complete    FOOD SECURITY SCREENING QUESTIONS  Hunger Vital Signs:  Within the past 12 months we worried whether our food would run out before we got money to buy more. Never  Within the past 12 months the food we bought just didn't last and we didn't have money to get more. Never  Kathryn Lindsey LPN 3/30/2023 8:19 AM      Patient declined urine pregnancy     Prior to the start of the procedure and with procedural staff participation, I verbally confirmed the patient s identity using two indicators, relevant allergies, that the procedure was appropriate and matched the consent or emergent situation, and that the correct equipment/implants were available. Immediately prior to starting the procedure I conducted the Time Out with the procedural staff and re-confirmed the patient s name, procedure, and site/side. (The Joint Commission universal protocol was followed.)  Yes    Sedation (Moderate or Deep): None    Kathryn Lindsey LPN

## 2023-04-03 LAB
PATH REPORT.COMMENTS IMP SPEC: NORMAL
PATH REPORT.FINAL DX SPEC: NORMAL
PHOTO IMAGE: NORMAL

## 2023-05-02 ENCOUNTER — ANESTHESIA EVENT (OUTPATIENT)
Dept: SURGERY | Facility: OTHER | Age: 43
End: 2023-05-02
Payer: COMMERCIAL

## 2023-05-02 RX ORDER — OXYCODONE HYDROCHLORIDE 5 MG/1
10 TABLET ORAL
Status: CANCELLED | OUTPATIENT
Start: 2023-05-02

## 2023-05-02 RX ORDER — FENTANYL CITRATE 50 UG/ML
25 INJECTION, SOLUTION INTRAMUSCULAR; INTRAVENOUS
Status: CANCELLED | OUTPATIENT
Start: 2023-05-02

## 2023-05-02 RX ORDER — OXYCODONE HYDROCHLORIDE 5 MG/1
5 TABLET ORAL
Status: CANCELLED | OUTPATIENT
Start: 2023-05-02

## 2023-05-03 ENCOUNTER — ANESTHESIA (OUTPATIENT)
Dept: SURGERY | Facility: OTHER | Age: 43
End: 2023-05-03
Payer: COMMERCIAL

## 2023-05-03 ENCOUNTER — HOSPITAL ENCOUNTER (OUTPATIENT)
Facility: OTHER | Age: 43
Discharge: HOME | End: 2023-05-03
Attending: OBSTETRICS & GYNECOLOGY | Admitting: OBSTETRICS & GYNECOLOGY
Payer: COMMERCIAL

## 2023-05-03 VITALS
BODY MASS INDEX: 23.05 KG/M2 | SYSTOLIC BLOOD PRESSURE: 119 MMHG | TEMPERATURE: 97.5 F | WEIGHT: 135 LBS | RESPIRATION RATE: 14 BRPM | OXYGEN SATURATION: 100 % | DIASTOLIC BLOOD PRESSURE: 77 MMHG | HEART RATE: 52 BPM | HEIGHT: 64 IN

## 2023-05-03 DIAGNOSIS — N93.9 ABNORMAL UTERINE BLEEDING (AUB): ICD-10-CM

## 2023-05-03 DIAGNOSIS — Z98.890 S/P ENDOMETRIAL ABLATION: Primary | ICD-10-CM

## 2023-05-03 LAB — HCG UR QL: NEGATIVE

## 2023-05-03 PROCEDURE — 250N000009 HC RX 250: Performed by: NURSE ANESTHETIST, CERTIFIED REGISTERED

## 2023-05-03 PROCEDURE — 710N000012 HC RECOVERY PHASE 2, PER MINUTE: Performed by: OBSTETRICS & GYNECOLOGY

## 2023-05-03 PROCEDURE — 58563 HYSTEROSCOPY ABLATION: CPT | Performed by: NURSE ANESTHETIST, CERTIFIED REGISTERED

## 2023-05-03 PROCEDURE — 258N000003 HC RX IP 258 OP 636

## 2023-05-03 PROCEDURE — 258N000001 HC RX 258: Performed by: OBSTETRICS & GYNECOLOGY

## 2023-05-03 PROCEDURE — 370N000017 HC ANESTHESIA TECHNICAL FEE, PER MIN: Performed by: OBSTETRICS & GYNECOLOGY

## 2023-05-03 PROCEDURE — 58563 HYSTEROSCOPY ABLATION: CPT | Performed by: OBSTETRICS & GYNECOLOGY

## 2023-05-03 PROCEDURE — 250N000013 HC RX MED GY IP 250 OP 250 PS 637: Performed by: OBSTETRICS & GYNECOLOGY

## 2023-05-03 PROCEDURE — C1888 ENDOVAS NON-CARDIAC ABL CATH: HCPCS | Performed by: OBSTETRICS & GYNECOLOGY

## 2023-05-03 PROCEDURE — 360N000076 HC SURGERY LEVEL 3, PER MIN: Performed by: OBSTETRICS & GYNECOLOGY

## 2023-05-03 PROCEDURE — 88305 TISSUE EXAM BY PATHOLOGIST: CPT

## 2023-05-03 PROCEDURE — 272N000001 HC OR GENERAL SUPPLY STERILE: Performed by: OBSTETRICS & GYNECOLOGY

## 2023-05-03 PROCEDURE — 250N000011 HC RX IP 250 OP 636: Performed by: OBSTETRICS & GYNECOLOGY

## 2023-05-03 PROCEDURE — 250N000011 HC RX IP 250 OP 636: Performed by: NURSE ANESTHETIST, CERTIFIED REGISTERED

## 2023-05-03 PROCEDURE — 81025 URINE PREGNANCY TEST: CPT | Performed by: OBSTETRICS & GYNECOLOGY

## 2023-05-03 PROCEDURE — 999N000141 HC STATISTIC PRE-PROCEDURE NURSING ASSESSMENT: Performed by: OBSTETRICS & GYNECOLOGY

## 2023-05-03 RX ORDER — KETOROLAC TROMETHAMINE 30 MG/ML
30 INJECTION, SOLUTION INTRAMUSCULAR; INTRAVENOUS ONCE
Status: COMPLETED | OUTPATIENT
Start: 2023-05-03 | End: 2023-05-03

## 2023-05-03 RX ORDER — OXYCODONE HYDROCHLORIDE 5 MG/1
5-10 TABLET ORAL EVERY 4 HOURS PRN
Qty: 6 TABLET | Refills: 0 | Status: SHIPPED | OUTPATIENT
Start: 2023-05-03

## 2023-05-03 RX ORDER — OXYCODONE HYDROCHLORIDE 5 MG/1
5 TABLET ORAL
Status: CANCELLED | OUTPATIENT
Start: 2023-05-03

## 2023-05-03 RX ORDER — LIDOCAINE HYDROCHLORIDE 20 MG/ML
INJECTION, SOLUTION INFILTRATION; PERINEURAL PRN
Status: DISCONTINUED | OUTPATIENT
Start: 2023-05-03 | End: 2023-05-03

## 2023-05-03 RX ORDER — PROPOFOL 10 MG/ML
INJECTION, EMULSION INTRAVENOUS CONTINUOUS PRN
Status: DISCONTINUED | OUTPATIENT
Start: 2023-05-03 | End: 2023-05-03

## 2023-05-03 RX ORDER — SODIUM CHLORIDE, SODIUM LACTATE, POTASSIUM CHLORIDE, CALCIUM CHLORIDE 600; 310; 30; 20 MG/100ML; MG/100ML; MG/100ML; MG/100ML
INJECTION, SOLUTION INTRAVENOUS CONTINUOUS
Status: DISCONTINUED | OUTPATIENT
Start: 2023-05-03 | End: 2023-05-03 | Stop reason: HOSPADM

## 2023-05-03 RX ORDER — ONDANSETRON 2 MG/ML
INJECTION INTRAMUSCULAR; INTRAVENOUS PRN
Status: DISCONTINUED | OUTPATIENT
Start: 2023-05-03 | End: 2023-05-03

## 2023-05-03 RX ORDER — IBUPROFEN 200 MG
800 TABLET ORAL ONCE
Status: CANCELLED | OUTPATIENT
Start: 2023-05-03 | End: 2023-05-03

## 2023-05-03 RX ORDER — ACETAMINOPHEN 325 MG/1
975 TABLET ORAL ONCE
Status: COMPLETED | OUTPATIENT
Start: 2023-05-03 | End: 2023-05-03

## 2023-05-03 RX ORDER — PROPOFOL 10 MG/ML
INJECTION, EMULSION INTRAVENOUS PRN
Status: DISCONTINUED | OUTPATIENT
Start: 2023-05-03 | End: 2023-05-03

## 2023-05-03 RX ORDER — LIDOCAINE 40 MG/G
CREAM TOPICAL
Status: DISCONTINUED | OUTPATIENT
Start: 2023-05-03 | End: 2023-05-03 | Stop reason: HOSPADM

## 2023-05-03 RX ORDER — FENTANYL CITRATE 50 UG/ML
INJECTION, SOLUTION INTRAMUSCULAR; INTRAVENOUS PRN
Status: DISCONTINUED | OUTPATIENT
Start: 2023-05-03 | End: 2023-05-03

## 2023-05-03 RX ORDER — BUPIVACAINE HYDROCHLORIDE 2.5 MG/ML
INJECTION, SOLUTION INFILTRATION; PERINEURAL PRN
Status: DISCONTINUED | OUTPATIENT
Start: 2023-05-03 | End: 2023-05-03 | Stop reason: HOSPADM

## 2023-05-03 RX ORDER — ACETAMINOPHEN 325 MG/1
975 TABLET ORAL ONCE
Status: CANCELLED | OUTPATIENT
Start: 2023-05-03 | End: 2023-05-03

## 2023-05-03 RX ADMIN — FENTANYL CITRATE 50 MCG: 50 INJECTION, SOLUTION INTRAMUSCULAR; INTRAVENOUS at 10:36

## 2023-05-03 RX ADMIN — PROPOFOL 100 MG: 10 INJECTION, EMULSION INTRAVENOUS at 10:36

## 2023-05-03 RX ADMIN — SODIUM CHLORIDE, SODIUM LACTATE, POTASSIUM CHLORIDE, AND CALCIUM CHLORIDE: 600; 310; 30; 20 INJECTION, SOLUTION INTRAVENOUS at 09:52

## 2023-05-03 RX ADMIN — ONDANSETRON HYDROCHLORIDE 4 MG: 2 SOLUTION INTRAMUSCULAR; INTRAVENOUS at 10:36

## 2023-05-03 RX ADMIN — KETOROLAC TROMETHAMINE 30 MG: 30 INJECTION, SOLUTION INTRAMUSCULAR; INTRAVENOUS at 09:52

## 2023-05-03 RX ADMIN — ACETAMINOPHEN 975 MG: 325 TABLET ORAL at 09:52

## 2023-05-03 RX ADMIN — FENTANYL CITRATE 50 MCG: 50 INJECTION, SOLUTION INTRAMUSCULAR; INTRAVENOUS at 10:43

## 2023-05-03 RX ADMIN — MIDAZOLAM HYDROCHLORIDE 2 MG: 1 INJECTION, SOLUTION INTRAMUSCULAR; INTRAVENOUS at 10:36

## 2023-05-03 RX ADMIN — PROPOFOL 100 MCG/KG/MIN: 10 INJECTION, EMULSION INTRAVENOUS at 10:36

## 2023-05-03 RX ADMIN — LIDOCAINE HYDROCHLORIDE 40 MG: 20 INJECTION, SOLUTION INFILTRATION; PERINEURAL at 10:36

## 2023-05-03 RX ADMIN — SODIUM CHLORIDE, SODIUM LACTATE, POTASSIUM CHLORIDE, AND CALCIUM CHLORIDE: 600; 310; 30; 20 INJECTION, SOLUTION INTRAVENOUS at 11:18

## 2023-05-03 ASSESSMENT — ACTIVITIES OF DAILY LIVING (ADL)
ADLS_ACUITY_SCORE: 35
ADLS_ACUITY_SCORE: 33

## 2023-05-03 NOTE — OR NURSING
Patient has been discharged to home at 1230 via ambulatory accompanied by RN staff and spouse.    Written discharge instructions were provided to patient and spouse.  Prescriptions were e-scribed to [facility] pharmacy.  Patient states their pain is 0/10, and denies any nausea or dizziness upon discharge.    Patient and adult caring for them verbalize understanding of discharge instructions including no driving until tomorrow and no longer taking narcotic pain medications - no operating mechanical equipment and no making any important decisions.They understand reason for discharge, and necessary follow-up appointments.      Deb Otero, RN

## 2023-05-03 NOTE — ANESTHESIA POSTPROCEDURE EVALUATION
Patient: Lakisha KOROMA Block    Procedure: Procedure(s):  HYSTEROSCOPY, WITH DILATION AND CURETTAGE OF UTERUS WITH MYOSURE,  AND NOVASURE ENDOMETRIAL ABLATION       Anesthesia Type:  MAC    Note:  Disposition: Outpatient   Postop Pain Control: Uneventful            Sign Out: Well controlled pain   PONV: No   Neuro/Psych: Uneventful            Sign Out: Acceptable/Baseline neuro status   Airway/Respiratory: Uneventful            Sign Out: Acceptable/Baseline resp. status   CV/Hemodynamics: Uneventful            Sign Out: Acceptable CV status; No obvious hypovolemia; No obvious fluid overload   Other NRE: NONE   DID A NON-ROUTINE EVENT OCCUR? No           Last vitals:  Vitals Value Taken Time   /77 05/03/23 1200   Temp 97.5  F (36.4  C) 05/03/23 1122   Pulse 52 05/03/23 1200   Resp 14 05/03/23 1122   SpO2 100 % 05/03/23 1147   Vitals shown include unvalidated device data.    Electronically Signed By: LILIBETH Bee CRNA  May 3, 2023  1:06 PM

## 2023-05-03 NOTE — H&P
CC: abnormal uterine bleeding  HPI:  Lakisha is a 42 year old female who presents for evaluation of heavy and regular menstrual cycles  Patient's last menstrual period was 2023 (exact date).  Menstrual history: monthly and heavy changing protection frequently for 3-5 days.     OB History   No obstetric history on file.      Past Medical History        Past Medical History:   Diagnosis Date     History of other genital system and obstetric disorders        A1, miscarriage-Normal vaginal delivery at term         Past Surgical History         Past Surgical History:   Procedure Laterality Date     AMPUTATE TOE(S) Left 2019     Procedure: PARTIAL AMPUTATION, TOE 3RD DIGIT;  Surgeon: Kj Avalos MD;  Location: [facility] OR     HX AMPUTATION Left 2019     Partial amputation left third ( middle ) toe         Social History   Social History            Socioeconomic History     Marital status:        Spouse name: Not on file     Number of children: Not on file     Years of education: Not on file     Highest education level: Not on file   Occupational History     Not on file   Tobacco Use     Smoking status: Never     Smokeless tobacco: Never   Substance and Sexual Activity     Alcohol use: Not Currently     Drug use: Never     Sexual activity: Not on file   Other Topics Concern     Not on file   Social History Narrative     .  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     Preload  2013      Social Determinants of Health      Financial Resource Strain: Not on file   Food Insecurity: Not on file   Transportation Needs: Not on file   Physical Activity: Not on file   Stress: Not on file   Social Connections: Not on file   Intimate Partner Violence: Not on file   Housing Stability: Not on file         Family History         Family History   Problem Relation Age of Onset     Heart Disease Maternal Grandfather           Heart Disease,MI     Colon  Cancer Paternal Grandmother 58         Cancer-colon     Heart Disease Maternal Grandmother           Heart Disease,MI     Other - See Comments Paternal Uncle 40         Colon polyps             Current Outpatient Medications   Medication     cholecalciferol (VITAMIN D3) 5000 units (125 mcg) capsule     sertraline (ZOLOFT) 50 MG tablet      No current facility-administered medications for this visit.      No Known Allergies  /64   Pulse 84   Wt 61.2 kg (135 lb)   LMP 03/20/2023 (Exact Date)      REVIEW OF SYSTEMS  Neg except as above     Exam:  Constitutional: healthy, alert, active and no distress  CV: RRR no GRM   Resp: CTA B   Abdomen: NT, ND   Pelvic: Normal BUSE, vulva appears normal, vagina and cervix are normal. . Chaperone was present.   After consent was obtained, the cervix was prepped with betadine. The cervix was grasped with a tenaculum and gently dilated with an Os Finder. She sounded to 10 cm and two passes were performed productive of modest amount of tissue with the pipele instrument. The cervix was released and no bleeding noted. Patient tolerated the procedure well.     Lab: No results found for any visits on 03/30/23.     ASSESSMENT/PLAN :  1. Abnormal uterine bleeding (AUB)       Assuming a normal biopsy she would like surgical intervention in the form of endometrial ablation and would like to avoid further hormone use. She is OK for MAC anesthesia and will schedule her accordingly for Hysteroscopy, Myosure endometrial resection and Novasure endometrial ablation.     Morgan Durbin MD FACOG  10:16 AM 5/3/2023

## 2023-05-03 NOTE — OP NOTE
[facility] Gynecology Operative Note    Pre-operative diagnosis: Menometrorhaggia   Post-operative diagnosis: Same  Endometrial polyps   Procedure: Hysteroscopy with Myosure resection of endometrium and polyps  Novasure endometrial ablation   Surgeon: Morgan Durbin MD   Assistant(s): None   Anesthesia: MAC (monitored anesthesia care)   Estimated blood loss: less than 50ml   Total IV fluids: (See anesthesia record)   Blood transfusion: No transfusion was given during surgery   Total urine output: (See anesthesia record)   Drains: None   Specimens: Endometrial curetting ostensibly with polyps   Findings: Endometrial polyps or pseudopolyps   Complications: None   Condition: Stable   Procedure details: Description of Operative Procedure:  After consent was obtained the patient was taken to the OR and sedated. She was prepped and draped sterilely and a timeout performed. A speculum wasplaced and the cervix grasped with a tenaculum. A paracervical block was performed with 0.5% marcaine without dilute epinephrine. Hysteroscopy was performed noting a normal cavity. Endometrial resection with Myosure device was performed. Pathology was submitted as endometrial curettings. The uterus sounded to 9 cm and the cervix measured to 3 cm noting an endometrial cavity length of 6 cm. The Novosure device was seated and the uterine width measured at 3.5 cm. The CO2 seal wasconfirmed and the Novosure device activated. 1:10 minutes of therapy was completed at 116 [address] and the device removed intact. Hysteroscopy revealed a successful treatment with no evidence of perforation. The tenaculum wasreleased and the cervix treated with cautery to achieve hemostasis. The patient was awakened and taken to PACU stable. No complications occurred.    EBL: 50 ml  Specimen:  Endometrial curettings    Morgan Durbin MD FACOG  11:26 AM 5/3/2023

## 2023-05-03 NOTE — ANESTHESIA CARE TRANSFER NOTE
Patient: Lakisha Mccoy    Procedure: Procedure(s):  HYSTEROSCOPY, WITH DILATION AND CURETTAGE OF UTERUS WITH MYOSURE,  AND NOVASURE ENDOMETRIAL ABLATION       Diagnosis: Abnormal uterine bleeding (AUB) [N93.9]  Diagnosis Additional Information: No value filed.    Anesthesia Type:   MAC     Note:    Oropharynx: oropharynx clear of all foreign objects  Level of Consciousness: awake  Oxygen Supplementation: room air    Independent Airway: airway patency satisfactory and stable  Dentition: dentition unchanged  Vital Signs Stable: post-procedure vital signs reviewed and stable  Report to RN Given: handoff report given  Patient transferred to: Phase II    Handoff Report: Identifed the Patient, Identified the Reponsible Provider, Reviewed the pertinent medical history, Discussed the surgical course, Reviewed Intra-OP anesthesia mangement and issues during anesthesia, Set expectations for post-procedure period and Allowed opportunity for questions and acknowledgement of understanding      Vitals:  Vitals Value Taken Time   BP     Temp     Pulse     Resp     SpO2         Electronically Signed By: LILIBETH CRENSHAW CRNA  May 3, 2023  11:24 AM

## 2023-05-03 NOTE — ANESTHESIA PREPROCEDURE EVALUATION
Anesthesia Pre-Procedure Evaluation    Patient: Lakisha Mccoy   MRN: [id number] : 1980        Procedure : Procedure(s):  HYSTEROSCOPY, WITH DILATION AND CURETTAGE OF UTERUS WITH MYOSURE,  AND NOVASURE ENDOMETRIAL ABLATION          Past Medical History:   Diagnosis Date     History of other genital system and obstetric disorders      A1, miscarriage-Normal vaginal delivery at term      Past Surgical History:   Procedure Laterality Date     AMPUTATE TOE(S) Left 2019    Procedure: PARTIAL AMPUTATION, TOE 3RD DIGIT;  Surgeon: Kj Avalos MD;  Location: [facility] OR     HX AMPUTATION Left 2019    Partial amputation left third ( middle ) toe      No Known Allergies   Social History     Tobacco Use     Smoking status: Never     Smokeless tobacco: Never   Vaping Use     Vaping status: Not on file   Substance Use Topics     Alcohol use: Not Currently      Wt Readings from Last 1 Encounters:   23 61.2 kg (135 lb)        Anesthesia Evaluation            ROS/MED HX  ENT/Pulmonary:     (+) allergic rhinitis,     Neurologic:       Cardiovascular:       METS/Exercise Tolerance: >4 METS    Hematologic:       Musculoskeletal:       GI/Hepatic:       Renal/Genitourinary:       Endo:       Psychiatric/Substance Use:     (+) psychiatric history anxiety     Infectious Disease:       Malignancy:       Other: 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  High grade squamous intraepithelial lesion (HGSIL) on cytologic smear of cervix  Irregular periods               Physical Exam    Airway        Mallampati: II   TM distance: > 3 FB   Neck ROM: full   Mouth opening: > 3 cm    Respiratory Devices and Support         Dental       (+) Minor Abnormalities - some fillings, tiny chips      Cardiovascular   cardiovascular exam normal       Rhythm and rate: regular and normal     Pulmonary   pulmonary exam normal        breath sounds clear to auscultation           OUTSIDE LABS:  CBC: No results found for: WBC, HGB, HCT, PLT  BMP: No results  found for: NA, POTASSIUM, CHLORIDE, CO2, BUN, CR, GLC  COAGS: No results found for: PTT, INR, FIBR  POC:   Lab Results   Component Value Date    HCG Negative 05/03/2023     HEPATIC: No results found for: ALBUMIN, PROTTOTAL, ALT, AST, GGT, ALKPHOS, BILITOTAL, BILIDIRECT, JOHNSON  OTHER: No results found for: PH, LACT, A1C, VERONICA, PHOS, MAG, LIPASE, AMYLASE, TSH, T4, T3, CRP, SED    Anesthesia Plan    ASA Status:  1   NPO Status:  NPO Appropriate    Anesthesia Type: MAC.     - Reason for MAC: straight local not clinically adequate   Induction: Intravenous.   Maintenance: Balanced.        Consents    Anesthesia Plan(s) and associated risks, benefits, and realistic alternatives discussed. Questions answered and patient/representative(s) expressed understanding.     - Discussed: Risks, Benefits and Alternatives for BOTH SEDATION and the PROCEDURE were discussed     - Discussed with:  Patient, Spouse         Postoperative Care    Pain management: IV analgesics, Multi-modal analgesia.   PONV prophylaxis: Ondansetron (or other 5HT-3)     Comments:    Other Comments: Risks, benefits and alternatives discussed and would like to proceed. General anesthesia ok if indicated.             LILIBETH Bee CRNA

## 2023-05-03 NOTE — DISCHARGE INSTRUCTIONS
[facility]  Adult Discharge Orders & Instructions    ________________________________________________________________          For 12 hours after surgery  Get plenty of rest.  A responsible adult must stay with you for at least 12 hours after you leave the hospital.   You may feel lightheaded.  IF so, sit for a few minutes before standing.  Have someone help you get up.   You may have a slight fever. Call the doctor if your fever is over 101 F (38.3 C) (taken under the tongue) or lasts longer than 24 hours.  You may have a dry mouth, a sore throat, muscle aches or trouble sleeping.  These should go away after 24 hours.  Do not make important or legal decisions.  6.   Do not drive or use heavy equipment.  If you have weakness or tingling, don't drive or use heavy equipment until this feeling goes away.    To contact a doctor, call   [phone]_______________________

## 2023-05-05 LAB
PATH REPORT.COMMENTS IMP SPEC: NORMAL
PATH REPORT.FINAL DX SPEC: NORMAL
PATH REPORT.RELEVANT HX SPEC: NORMAL
PHOTO IMAGE: NORMAL

## 2023-07-25 ENCOUNTER — OFFICE VISIT (OUTPATIENT)
Dept: FAMILY MEDICINE | Facility: OTHER | Age: 43
End: 2023-07-25
Attending: STUDENT IN AN ORGANIZED HEALTH CARE EDUCATION/TRAINING PROGRAM
Payer: COMMERCIAL

## 2023-07-25 VITALS
DIASTOLIC BLOOD PRESSURE: 72 MMHG | HEART RATE: 76 BPM | SYSTOLIC BLOOD PRESSURE: 120 MMHG | HEIGHT: 64 IN | WEIGHT: 137.4 LBS | BODY MASS INDEX: 23.46 KG/M2 | TEMPERATURE: 97.1 F | OXYGEN SATURATION: 99 % | RESPIRATION RATE: 16 BRPM

## 2023-07-25 DIAGNOSIS — R39.89 URINARY PROBLEM: Primary | ICD-10-CM

## 2023-07-25 LAB
ALBUMIN UR-MCNC: NEGATIVE MG/DL
APPEARANCE UR: CLEAR
BACTERIAL VAGINOSIS VAG-IMP: NEGATIVE
BILIRUB UR QL STRIP: NEGATIVE
CANDIDA DNA VAG QL NAA+PROBE: NOT DETECTED
CANDIDA GLABRATA / CANDIDA KRUSEI DNA: NOT DETECTED
COLOR UR AUTO: YELLOW
GLUCOSE UR STRIP-MCNC: NEGATIVE MG/DL
HGB UR QL STRIP: NEGATIVE
KETONES UR STRIP-MCNC: NEGATIVE MG/DL
LEUKOCYTE ESTERASE UR QL STRIP: NEGATIVE
NITRATE UR QL: NEGATIVE
PH UR STRIP: 6 [PH] (ref 5–9)
SP GR UR STRIP: 1.01 (ref 1–1.03)
T VAGINALIS DNA VAG QL NAA+PROBE: NOT DETECTED
UROBILINOGEN UR STRIP-MCNC: NORMAL MG/DL

## 2023-07-25 PROCEDURE — 99203 OFFICE O/P NEW LOW 30 MIN: CPT

## 2023-07-25 PROCEDURE — 81003 URINALYSIS AUTO W/O SCOPE: CPT | Mod: ZL

## 2023-07-25 PROCEDURE — 87801 DETECT AGNT MULT DNA AMPLI: CPT | Mod: ZL

## 2023-07-25 ASSESSMENT — PAIN SCALES - GENERAL: PAINLEVEL: NO PAIN (0)

## 2023-07-25 NOTE — PROGRESS NOTES
ASSESSMENT/PLAN:    I have reviewed the nursing notes.  I have reviewed the findings, diagnosis, plan and need for follow up with the patient.    1. Urinary problem  - UA Macroscopic with reflex to Microscopic and Culture  - Multiplex Vaginal Panel by PCR    Patient presents with urinary symptoms and vaginal discharge.  Patient's vitals are stable and she appears nontoxic.  Urinalysis was negative for any signs of infection or other abnormalities.  Vaginal multiplex panel was also negative.  Discussed results with patient.  Discussed that symptoms could be due to bladder irritation, menses cycle changes, caffeine, alcohol, vaginitis, or urethritis.  Advised patient to push fluids and may take Tylenol and ibuprofen as needed for urinary discomfort.    Discussed warning signs/symptoms indicative of need to f/u    Follow up if symptoms persist or worsen or concerns    I explained my diagnostic considerations and recommendations to the patient, who voiced understanding and agreement with the treatment plan. All questions were answered. We discussed potential side effects of any prescribed or recommended therapies, as well as expectations for response to treatments.    LILIBETH Aggarwal CNP  7/25/2023  5:13 PM    HPI:    Lakisha Mccoy is a 42 year old female  who presents to [facility] today for concerns of urinary symptoms    Patient presents with concerns of possible UTI, x 4 weeks    Symptoms: frequency and suprapubic pain and pressure  Flank Pain or Back Pain: No  Blood in Urine: No  Last Urination: in clinic  Able to Completely Urinate/Void: Yes  Prior UTIs: Yes  Exposures to STIs/STDs: No  Fevers, chills, N/V/D: No  Change in Bowel Habits: No  Vaginal Symptoms or Discharge: Yes: occasional vaginal itching and discharge  Recent swimming/use of hot tubs/swimming pools/lakes: Yes    LMP: 4 weeks ago    Treatments tried: vaginal metronidazole and two separate doses of diflucan    Denies CP, SOB, calf tenderness.  "No new medications. Denies exposure to ill or COVID positive patients.     Allergies: NKA    PCP: Gómez    Past Medical History:   Diagnosis Date    History of other genital system and obstetric disorders      A1, miscarriage-Normal vaginal delivery at term     Past Surgical History:   Procedure Laterality Date    AMPUTATE TOE(S) Left 2019    Procedure: PARTIAL AMPUTATION, TOE 3RD DIGIT;  Surgeon: Kj Avalos MD;  Location: [facility] OR    DILATION AND CURETTAGE, HYSTEROSCOPY, ABLATE ENDOMETRIUM NOVASURE, COMBINED N/A 5/3/2023    Procedure: HYSTEROSCOPY, WITH DILATION AND CURETTAGE OF UTERUS WITH MYOSURE,  AND NOVASURE ENDOMETRIAL ABLATION;  Surgeon: Morgan Durbin MD;  Location: [facility] OR    HX AMPUTATION Left 2019    Partial amputation left third ( middle ) toe     Social History     Tobacco Use    Smoking status: Never    Smokeless tobacco: Never   Substance Use Topics    Alcohol use: Yes     Comment: wine occasionally     Current Outpatient Medications   Medication Sig Dispense Refill    cholecalciferol (VITAMIN D3) 5000 units (125 mcg) capsule Take 5,000 Units by mouth      sertraline (ZOLOFT) 50 MG tablet Take 50 mg by mouth      oxyCODONE (ROXICODONE) 5 MG tablet Take 1-2 tablets (5-10 mg) by mouth every 4 hours as needed for moderate to severe pain (Patient not taking: Reported on 2023) 6 tablet 0     No Known Allergies  Past medical history, past surgical history, current medications and allergies reviewed and accurate to the best of my knowledge.      ROS:  Refer to HPI    /72 (BP Location: Right arm, Patient Position: Sitting, Cuff Size: Adult Regular)   Pulse 76   Temp 97.1  F (36.2  C) (Tympanic)   Resp 16   Ht 1.62 m (5' 3.78\")   Wt 62.3 kg (137 lb 6.4 oz)   LMP 2023 (Approximate)   SpO2 99%   BMI 23.75 kg/m      EXAM:  General Appearance: Well appearing 42 year old female, appropriate appearance for age. No acute distress   Respiratory: normal chest wall and " respirations.  Normal effort.  Clear to auscultation bilaterally, no wheezing, crackles or rhonchi.  No increased work of breathing.  No cough appreciated.  Cardiac: RRR with no murmurs  :  No suprapubic tenderness to palpation.  Absent CVA tenderness to palpation.    Neuro: Alert and oriented to person, place, and time.    Psychological: normal affect, alert, oriented, and pleasant.     Labs:  Results for orders placed or performed in visit on 07/25/23   UA Macroscopic with reflex to Microscopic and Culture     Status: Normal    Specimen: Urine, Midstream   Result Value Ref Range    Color Urine Yellow Colorless, Straw, Light Yellow, Yellow    Appearance Urine Clear Clear    Glucose Urine Negative Negative mg/dL    Bilirubin Urine Negative Negative    Ketones Urine Negative Negative mg/dL    Specific Gravity Urine 1.007 1.000 - 1.030    Blood Urine Negative Negative    pH Urine 6.0 5.0 - 9.0    Protein Albumin Urine Negative Negative mg/dL    Urobilinogen Urine Normal Normal, 2.0 mg/dL    Nitrite Urine Negative Negative    Leukocyte Esterase Urine Negative Negative    Narrative    Microscopic not indicated   Multiplex Vaginal Panel by PCR     Status: Normal    Specimen: Vagina; Swab   Result Value Ref Range    Bacterial Vaginosis Organism DNA Negative Negative    Candida Group DNA Not Detected Not Detected    Candida glabrata / Krupa krusei DNA Not Detected Not Detected    Trichomonas vaginalis DNA Not Detected Not Detected    Narrative    The Xpert  Xpress MVP test, performed on the [employer], is an automated, qualitative in vitro diagnostic test for the detection of DNA targets from anaerobic bacteria associated with bacterial vaginosis, Candida species associated with vulvovaginal candidiasis, and Trichomonas vaginalis. The assay uses clinician-collected and self-collected vaginal swabs from patients who are symptomatic for vaginitis/ vaginosis. The Xpert  Xpress MVP test utilizes  real-time polymerase chain reaction (PCR) for the amplification of specific DNA targets and utilizes fluorogenic target-specific hybridization probes to detect and differentiate DNA. It is intended to aid in the diagnosis of vaginal infections in women with a clinical presentation consistent with bacterial vaginosis, vulvovaginal candidiasis, or trichomoniasis.   The assay targets three anaerobic microorgansims that are associated with bacterial vaginosis (BV). Other organisms that are not detected by the Xpert  Xpress MVP test have also been reported to be associated with BV. The BV organism and Candida species targets of the Xpert  Xpress MVP test can be commensal in women; positive results must be considered in conjunction with other clinical and patient information to determine the disease status.

## 2023-07-25 NOTE — NURSING NOTE
Chief Complaint   Patient presents with    Urinary Problem     Patient presents to the clinic for possible yeast infection, BV or UTI she stated she is having pressure in her lower abdomen for the past month     Elyssa Coon LPN       FOOD SECURITY SCREENING QUESTIONS:    The next two questions are to help us understand your food security.  If you are feeling you need any assistance in this area, we have resources available to support you today.    Hunger Vital Signs:  Within the past 12 months we worried whether our food would run out before we got money to buy more. Never  Within the past 12 months the food we bought just didn't last and we didn't have money to get more. Never    Food Insecurity: Not on file

## 2023-12-16 ENCOUNTER — HEALTH MAINTENANCE LETTER (OUTPATIENT)
Age: 43
End: 2023-12-16

## 2024-02-24 ENCOUNTER — HEALTH MAINTENANCE LETTER (OUTPATIENT)
Age: 44
End: 2024-02-24

## 2025-01-12 ENCOUNTER — HEALTH MAINTENANCE LETTER (OUTPATIENT)
Age: 45
End: 2025-01-12

## 2025-07-24 ENCOUNTER — HOSPITAL ENCOUNTER (OUTPATIENT)
Facility: OTHER | Age: 45
End: 2025-07-24
Attending: SURGERY | Admitting: SURGERY
Payer: COMMERCIAL

## 2025-08-12 RX ORDER — LIDOCAINE 40 MG/G
CREAM TOPICAL
OUTPATIENT
Start: 2025-08-12

## 2025-08-12 RX ORDER — SODIUM CHLORIDE, SODIUM LACTATE, POTASSIUM CHLORIDE, CALCIUM CHLORIDE 600; 310; 30; 20 MG/100ML; MG/100ML; MG/100ML; MG/100ML
INJECTION, SOLUTION INTRAVENOUS CONTINUOUS
OUTPATIENT
Start: 2025-08-12

## 2025-08-12 RX ORDER — ONDANSETRON 2 MG/ML
4 INJECTION INTRAMUSCULAR; INTRAVENOUS
OUTPATIENT
Start: 2025-08-12

## (undated) DEVICE — SEAL SET MYOSURE ROD LENS SCOPE SINGLE USE 40-902

## (undated) DEVICE — SU ETHILON 2-0  FSLX 30" 1674

## (undated) DEVICE — PREP SKIN SCRUB TRAY 4461A

## (undated) DEVICE — PAD PERI INDIV WRAP 11" 2022A

## (undated) DEVICE — NDL SPINAL 20GA 3.5" 405182

## (undated) DEVICE — DRSG KERLIX SUPER SPONGE 7310

## (undated) DEVICE — GLOVE ESTEEM POWDER FREE SMT 8.0  2D72PT80

## (undated) DEVICE — Device

## (undated) DEVICE — PACK LITHOTOMY SBA15LIFCA

## (undated) DEVICE — SU VICRYL 2-0 CT-2 27" UND J269H

## (undated) DEVICE — PAD CHUX UNDERPAD 30X36" P3036C

## (undated) DEVICE — TUBING SYS AQUILEX BLUE INFLOW AQL-110

## (undated) DEVICE — ESU ABLATION NOVASURE ADVANCED NS2013KITUS

## (undated) DEVICE — GLOVE BIOGEL PI INDICATOR 8.0 LF 41680

## (undated) DEVICE — SU ETHILON 2-0 FS 18" 664G

## (undated) DEVICE — ESU GROUND PAD ADULT W/CORD E7507

## (undated) DEVICE — DRSG TELFA 3X8" 1238

## (undated) DEVICE — SPONGE LAP 18X18" 23250-400

## (undated) DEVICE — SOL WATER 1500ML

## (undated) DEVICE — GLOVE PROTEXIS POWDER FREE SMT 7.5  2D72PT75X

## (undated) DEVICE — SYR 10ML FINGER CONTROL W/O NDL 309695

## (undated) DEVICE — NDL 25GA 1.5" 305127

## (undated) DEVICE — DRSG KERLIX 4 1/2"X4YDS ROLL 6715

## (undated) DEVICE — SUCTION TIP YANKAUER W/O VENT K86

## (undated) DEVICE — SLEEVE COMPRESSION SCD KNEE MED 74022

## (undated) DEVICE — PANTIES MESH LG/XLG 2PK 706M2

## (undated) DEVICE — GLOVE PROTEXIS BLUE W/NEU-THERA 8.0  2D73EB80

## (undated) DEVICE — PACK MAJOR EXTREMITY SOP15MEFCA

## (undated) DEVICE — DECANTER VIAL 2006S

## (undated) DEVICE — BLADE KNIFE SURG 15 SAFETY 373915

## (undated) DEVICE — COVER LIGHT HANDLE LT-F02

## (undated) DEVICE — TUBING SYS AQUILEX YELLOW OUTFLOW AQL-111

## (undated) RX ORDER — KETOROLAC TROMETHAMINE 15 MG/ML
INJECTION, SOLUTION INTRAMUSCULAR; INTRAVENOUS
Status: DISPENSED
Start: 2019-11-16

## (undated) RX ORDER — LIDOCAINE HYDROCHLORIDE 10 MG/ML
INJECTION, SOLUTION EPIDURAL; INFILTRATION; INTRACAUDAL; PERINEURAL
Status: DISPENSED
Start: 2019-11-16

## (undated) RX ORDER — KETOROLAC TROMETHAMINE 30 MG/ML
INJECTION, SOLUTION INTRAMUSCULAR; INTRAVENOUS
Status: DISPENSED
Start: 2023-05-03

## (undated) RX ORDER — BUPIVACAINE HYDROCHLORIDE 2.5 MG/ML
INJECTION, SOLUTION EPIDURAL; INFILTRATION; INTRACAUDAL
Status: DISPENSED
Start: 2019-11-16

## (undated) RX ORDER — GINSENG 100 MG
CAPSULE ORAL
Status: DISPENSED
Start: 2019-11-16

## (undated) RX ORDER — SODIUM CHLORIDE, SODIUM LACTATE, POTASSIUM CHLORIDE, CALCIUM CHLORIDE 600; 310; 30; 20 MG/100ML; MG/100ML; MG/100ML; MG/100ML
INJECTION, SOLUTION INTRAVENOUS
Status: DISPENSED
Start: 2023-05-03

## (undated) RX ORDER — ACETAMINOPHEN 10 MG/ML
INJECTION, SOLUTION INTRAVENOUS
Status: DISPENSED
Start: 2019-11-16

## (undated) RX ORDER — KETOROLAC TROMETHAMINE 30 MG/ML
INJECTION, SOLUTION INTRAMUSCULAR; INTRAVENOUS
Status: DISPENSED
Start: 2019-11-16

## (undated) RX ORDER — PROPOFOL 10 MG/ML
INJECTION, EMULSION INTRAVENOUS
Status: DISPENSED
Start: 2023-05-03

## (undated) RX ORDER — SODIUM CHLORIDE, SODIUM LACTATE, POTASSIUM CHLORIDE, CALCIUM CHLORIDE 600; 310; 30; 20 MG/100ML; MG/100ML; MG/100ML; MG/100ML
INJECTION, SOLUTION INTRAVENOUS
Status: DISPENSED
Start: 2019-11-16

## (undated) RX ORDER — FENTANYL CITRATE 50 UG/ML
INJECTION, SOLUTION INTRAMUSCULAR; INTRAVENOUS
Status: DISPENSED
Start: 2019-11-16

## (undated) RX ORDER — ACETAMINOPHEN 325 MG/1
TABLET ORAL
Status: DISPENSED
Start: 2023-05-03

## (undated) RX ORDER — CEFAZOLIN SODIUM 1 G/3ML
INJECTION, POWDER, FOR SOLUTION INTRAMUSCULAR; INTRAVENOUS
Status: DISPENSED
Start: 2019-11-16

## (undated) RX ORDER — ACETAMINOPHEN 650 MG
TABLET, EXTENDED RELEASE ORAL
Status: DISPENSED
Start: 2019-11-16

## (undated) RX ORDER — SODIUM CHLORIDE 9 MG/ML
INJECTION, SOLUTION INTRAVENOUS
Status: DISPENSED
Start: 2019-11-16

## (undated) RX ORDER — FENTANYL CITRATE 50 UG/ML
INJECTION, SOLUTION INTRAMUSCULAR; INTRAVENOUS
Status: DISPENSED
Start: 2023-05-03

## (undated) RX ORDER — ONDANSETRON 2 MG/ML
INJECTION INTRAMUSCULAR; INTRAVENOUS
Status: DISPENSED
Start: 2023-05-03

## (undated) RX ORDER — BUPIVACAINE HYDROCHLORIDE 2.5 MG/ML
INJECTION, SOLUTION EPIDURAL; INFILTRATION; INTRACAUDAL
Status: DISPENSED
Start: 2023-05-03